# Patient Record
Sex: MALE | Race: WHITE | Employment: UNEMPLOYED | URBAN - METROPOLITAN AREA
[De-identification: names, ages, dates, MRNs, and addresses within clinical notes are randomized per-mention and may not be internally consistent; named-entity substitution may affect disease eponyms.]

---

## 2023-03-12 ENCOUNTER — HOSPITAL ENCOUNTER (EMERGENCY)
Age: 78
Discharge: HOME | End: 2023-03-13
Attending: EMERGENCY MEDICINE
Payer: MEDICARE

## 2023-03-12 DIAGNOSIS — R07.9 CHEST PAIN, UNSPECIFIED TYPE: Primary | ICD-10-CM

## 2023-03-12 LAB
ANION GAP SERPL CALCULATED.3IONS-SCNC: 13 MMOL/L (ref 3–16)
BASOPHILS ABSOLUTE: 0.1 K/UL (ref 0–0.2)
BASOPHILS RELATIVE PERCENT: 1.1 %
BUN BLDV-MCNC: 16 MG/DL (ref 7–20)
CALCIUM SERPL-MCNC: 10 MG/DL (ref 8.3–10.6)
CHLORIDE BLD-SCNC: 100 MMOL/L (ref 99–110)
CO2: 24 MMOL/L (ref 21–32)
CREAT SERPL-MCNC: 1 MG/DL (ref 0.8–1.3)
EOSINOPHILS ABSOLUTE: 0.3 K/UL (ref 0–0.6)
EOSINOPHILS RELATIVE PERCENT: 3.6 %
GFR SERPL CREATININE-BSD FRML MDRD: >60 ML/MIN/{1.73_M2}
GLUCOSE BLD-MCNC: 126 MG/DL (ref 70–99)
HCT VFR BLD CALC: 38 % (ref 40.5–52.5)
HEMOGLOBIN: 12.7 G/DL (ref 13.5–17.5)
LYMPHOCYTES ABSOLUTE: 2.2 K/UL (ref 1–5.1)
LYMPHOCYTES RELATIVE PERCENT: 24.5 %
MCH RBC QN AUTO: 28.2 PG (ref 26–34)
MCHC RBC AUTO-ENTMCNC: 33.5 G/DL (ref 31–36)
MCV RBC AUTO: 84.4 FL (ref 80–100)
MONOCYTES ABSOLUTE: 0.7 K/UL (ref 0–1.3)
MONOCYTES RELATIVE PERCENT: 8 %
NEUTROPHILS ABSOLUTE: 5.6 K/UL (ref 1.7–7.7)
NEUTROPHILS RELATIVE PERCENT: 62.8 %
PDW BLD-RTO: 13.7 % (ref 12.4–15.4)
PLATELET # BLD: 299 K/UL (ref 135–450)
PMV BLD AUTO: 8.1 FL (ref 5–10.5)
POTASSIUM REFLEX MAGNESIUM: 3.9 MMOL/L (ref 3.5–5.1)
RBC # BLD: 4.5 M/UL (ref 4.2–5.9)
SODIUM BLD-SCNC: 137 MMOL/L (ref 136–145)
TROPONIN: <0.01 NG/ML
WBC # BLD: 8.8 K/UL (ref 4–11)

## 2023-03-12 PROCEDURE — 80048 BASIC METABOLIC PNL TOTAL CA: CPT

## 2023-03-12 PROCEDURE — 84484 ASSAY OF TROPONIN QUANT: CPT

## 2023-03-12 PROCEDURE — 85025 COMPLETE CBC W/AUTO DIFF WBC: CPT

## 2023-03-12 PROCEDURE — 36415 COLL VENOUS BLD VENIPUNCTURE: CPT

## 2023-03-12 PROCEDURE — 93005 ELECTROCARDIOGRAM TRACING: CPT | Performed by: EMERGENCY MEDICINE

## 2023-03-12 PROCEDURE — 99284 EMERGENCY DEPT VISIT MOD MDM: CPT

## 2023-03-12 ASSESSMENT — PAIN - FUNCTIONAL ASSESSMENT: PAIN_FUNCTIONAL_ASSESSMENT: 0-10

## 2023-03-12 ASSESSMENT — PAIN DESCRIPTION - DESCRIPTORS: DESCRIPTORS: PRESSURE

## 2023-03-12 ASSESSMENT — PAIN DESCRIPTION - FREQUENCY: FREQUENCY: CONTINUOUS

## 2023-03-12 ASSESSMENT — PAIN DESCRIPTION - LOCATION: LOCATION: CHEST

## 2023-03-12 ASSESSMENT — PAIN SCALES - GENERAL: PAINLEVEL_OUTOF10: 2

## 2023-03-13 VITALS
TEMPERATURE: 97.5 F | OXYGEN SATURATION: 99 % | HEART RATE: 72 BPM | SYSTOLIC BLOOD PRESSURE: 128 MMHG | DIASTOLIC BLOOD PRESSURE: 73 MMHG | RESPIRATION RATE: 17 BRPM

## 2023-03-13 LAB
EKG ATRIAL RATE: 104 BPM
EKG DIAGNOSIS: NORMAL
EKG Q-T INTERVAL: 482 MS
EKG QRS DURATION: 106 MS
EKG QTC CALCULATION (BAZETT): 545 MS
EKG R AXIS: -40 DEGREES
EKG T AXIS: -20 DEGREES
EKG VENTRICULAR RATE: 77 BPM

## 2023-03-13 ASSESSMENT — PAIN SCALES - GENERAL: PAINLEVEL_OUTOF10: 0

## 2023-03-13 NOTE — DISCHARGE INSTRUCTIONS
Please return to the emergency department if you have any recurrence of chest pain or if you develop shortness of breath or any other new concerning symptoms

## 2023-03-13 NOTE — ED PROVIDER NOTES
[address] [facility]          ATTENDING PHYSICIAN NOTE       Date of evaluation: 3/12/2023    Chief Complaint     Chest Pain (Pt. Presents to ED with c/o chest pain for the past 30 minutes since his wife passed away in the hospital this evening. )      History of Present Illness     Ronald Dukes is a 68 y.o. male who presents with chest pain earlier this evening. The patient's wife just passed away and he was crying and grieving, developed chest pain at that time. It is now resolved and the patient wishes to return upstairs to be with his wife. He tells me that he has a primary care appointment on Tuesday at the [place]. ASSESSMENT / PLAN  (MEDICAL DECISION MAKING)     INITIAL VITALS: BP: 124/70, Temp: 97.5 °F (36.4 °C), Heart Rate: 76, Resp: 18, SpO2: 95 %      Ronald Dukes is a 68 y.o. male who presents with chest pain that occurred while grieving the death of his wife. The patient does not want any further evaluation or assessment. EKG without acute findings although no old for comparison. CBC with normal white count and hemoglobin of 12.7. Renal panel with normal electrolytes and renal function. Initial troponin negative. At this point, my plan would be to obtain a repeat troponin and get a chest x-ray. The patient does have a cardiac history and would potentially benefit from restratification via stress test or cardiology evaluation however the patient is currently chest pain-free and wishes to be discharged. He understands the risks of leaving prior to completing evaluation and states he will return if he has any recurrence of symptoms. This was discussed with his family present were in agreement. Medical Decision Making  Amount and/or Complexity of Data Reviewed  Independent Historian:      Details: family  Labs: ordered. ECG/medicine tests: ordered. Risk  Decision regarding hospitalization. Clinical Impression     1.  Chest pain, unspecified type        Disposition     PATIENT REFERRED TO:  Javy Cedeno  [address] [address] [facility]  [phone]    Call today  for follow up    [facility] Emergency Department  [address]  [phone]    If symptoms worsen      DISCHARGE MEDICATIONS:  New Prescriptions    No medications on file       DISPOSITION Decision To Discharge 03/13/2023 12:26:27 AM        Diagnostic Results and Other Data       RADIOLOGY:  No orders to display       LABS:   Results for orders placed or performed during the hospital encounter of 03/12/23   CBC with Auto Differential   Result Value Ref Range    WBC 8.8 4.0 - 11.0 K/uL    RBC 4.50 4. 20 - 5.90 M/uL    Hemoglobin 12.7 (L) 13.5 - 17.5 g/dL    Hematocrit 38.0 (L) 40.5 - 52.5 %    MCV 84.4 80.0 - 100.0 fL    MCH 28.2 26.0 - 34.0 pg    MCHC 33.5 31.0 - 36.0 g/dL    RDW 13.7 12.4 - 15.4 %    Platelets 710 919 - 342 K/uL    MPV 8.1 5.0 - 10.5 fL    Neutrophils % 62.8 %    Lymphocytes % 24.5 %    Monocytes % 8.0 %    Eosinophils % 3.6 %    Basophils % 1.1 %    Neutrophils Absolute 5.6 1.7 - 7.7 K/uL    Lymphocytes Absolute 2.2 1.0 - 5.1 K/uL    Monocytes Absolute 0.7 0.0 - 1.3 K/uL    Eosinophils Absolute 0.3 0.0 - 0.6 K/uL    Basophils Absolute 0.1 0.0 - 0.2 K/uL   Basic Metabolic Panel w/ Reflex to MG   Result Value Ref Range    Sodium 137 136 - 145 mmol/L    Potassium reflex Magnesium 3.9 3.5 - 5.1 mmol/L    Chloride 100 99 - 110 mmol/L    CO2 24 21 - 32 mmol/L    Anion Gap 13 3 - 16    Glucose 126 (H) 70 - 99 mg/dL    BUN 16 7 - 20 mg/dL    Creatinine 1.0 0.8 - 1.3 mg/dL    Est, Glom Filt Rate >60 >60    Calcium 10.0 8.3 - 10.6 mg/dL   Troponin   Result Value Ref Range    Troponin <0.01 <0.01 ng/mL       EKG   Atrial paced rhythm, left anterior fascicular block, no acute ST or T wave changes although no old for comparison    ED BEDSIDE ULTRASOUND:  No results found.     MOST RECENT VITALS:  BP: 124/70,Temp: 97.5 °F (36.4 °C), Heart Rate: 76, Resp: 18, SpO2: 95 %     Procedures     N/a    ED Course     Nursing Notes, Past Medical Hx, Past Surgical Hx, Social Hx,Allergies, and Family Hx were reviewed. The patient was given the following medications:  No orders of the defined types were placed in this encounter. CONSULTS:  None    Review of Systems     Otherwise negative except as mentioned in HPI. Past Medical, Surgical, Family, and Social History     He has a past medical history of Hypertension, MI (myocardial infarction) ([facility] [address] Migraine, Pacemaker, and Prostate CA ([facility] [address] He has a past surgical history that includes Prostatectomy. His family history is not on file. He reports that he has quit smoking. His smoking use included e-cigarettes. He has never used smokeless tobacco. He reports current alcohol use. He reports that he does not use drugs. Medications     Previous Medications    No medications on file       Allergies     He has No Known Allergies. Physical Exam     INITIAL VITALS: BP: 124/70, Temp: 97.5 °F (36.4 °C), Heart Rate: 76, Resp: 18, SpO2: 95 %       General:  Well appearing. No acute distress. Eyes:  Pupils reactive. No discharge from eyes. ENT:  No discharge from nose. OP clear. Neck:  Supple. Pulmonary:   Non-labored breathing. Musculoskeletal:  No CVA tenderness. Skin:  No rash. Neuro:  Alert and oriented x 4. Moves all four extremities to command. No focal deficit.               Helga Moran MD  03/13/23 0029